# Patient Record
Sex: FEMALE | Race: WHITE
[De-identification: names, ages, dates, MRNs, and addresses within clinical notes are randomized per-mention and may not be internally consistent; named-entity substitution may affect disease eponyms.]

---

## 2020-02-27 ENCOUNTER — HOSPITAL ENCOUNTER (EMERGENCY)
Dept: HOSPITAL 25 - UCEAST | Age: 40
Discharge: HOME | End: 2020-02-27
Payer: COMMERCIAL

## 2020-02-27 VITALS — DIASTOLIC BLOOD PRESSURE: 65 MMHG | SYSTOLIC BLOOD PRESSURE: 104 MMHG

## 2020-02-27 DIAGNOSIS — F41.9: ICD-10-CM

## 2020-02-27 DIAGNOSIS — M77.9: Primary | ICD-10-CM

## 2020-02-27 DIAGNOSIS — Z79.899: ICD-10-CM

## 2020-02-27 PROCEDURE — 99211 OFF/OP EST MAY X REQ PHY/QHP: CPT

## 2020-02-27 PROCEDURE — G0463 HOSPITAL OUTPT CLINIC VISIT: HCPCS

## 2020-02-27 NOTE — UC
Shoulder Pain HPI





- HPI Summary


HPI Summary: 





40 yo female presents with RIGHT shoulder pain. She tells me that she is quite 

active with yoga and going to the gym. Gradually over the last 3 weeks she has 

noticed aching right shoulder pain with "clicking" during certain movement. She 

has been taking ibuprofen and resting, which helps. Participating in weight 

training and yoga make her pain worse. She is right handed. Denies specific 

injury, numbness, tingling. 





- History of Current Complaint


Stated Complaint: R SHOULDER PAIN


Time Seen by Provider: 02/27/20 11:38


Hx Obtained From: Patient


Onset/Duration: Gradual Onset


Timing: Constant


Severity Initially: Mild


Severity Currently: Mild


Pain Intensity: 2


Pain Scale Used: 0-10 Numeric





- Allergies/Home Medications


Allergies/Adverse Reactions: 


 Allergies











Allergy/AdvReac Type Severity Reaction Status Date / Time


 


No Known Allergies Allergy   Verified 02/27/20 11:41











Home Medications: 


 Home Medications





FLUoxetine CAP* [PROzac CAP*] 20 mg PO DAILY 09/24/19 [History Confirmed 02/27/ 20]


Ibuprofen [Ibu-200] 800 mg PO TID PRN 09/24/19 [History Confirmed 02/27/20]


LORazepam [Lorazepam] 2 mg PO BEDTIME 09/24/19 [History Confirmed 02/27/20]


Multivitamin [Once Daily] 1 each PO DAILY 09/24/19 [History Confirmed 02/27/20]











PMH/Surg Hx/FS Hx/Imm Hx





- Additional Past Medical History


Additional PMH: 





Migraines


Psychological History: Anxiety





- Surgical History


Surgical History: None





- Family History


Known Family History: Positive: Other - Migarines





- Social History


Occupation: Employed Full-time


Lives: With Family


Alcohol Use: Weekly


Alcohol Amount: 5


Substance Use Type: Marijuana


Smoking Status (MU): Never Smoked Tobacco





Review of Systems


All Other Systems Reviewed And Are Negative: No


Constitutional: Positive: Negative


Skin: Positive: Negative


Respiratory: Positive: Negative


Cardiovascular: Positive: Negative


Neurovascular: Positive: Negative


Musculoskeletal: Positive: Other: - Right shoulder pain


Neurological/Mental Status: Positive: Negative


Psychological: Positive: Negative





Physical Exam





- Summary


Physical Exam Summary: 





GENERAL: NAD. WDWN. No pain distress.


SKIN: No rashes, sores, lesions, or open wounds.


CHEST:  No accessory muscle use. Breathing comfortably and in no distress.


CV:  Pulses intact radial and ulnar. Cap refill <2seconds


MSK: FROM. Strength 5/5. No edema or obvious bony deformities. Positive: bob menjivar, coy, yoly, and fabiano. 


NEURO: Alert. Sensations intact C4-T1 b/l


PSYCH: Age appropriate behavior.


Triage Information Reviewed: Yes


Vital Signs: 





Vital Signs:











Temp Pulse Resp BP Pulse Ox


 


 97.5 F   70   20   104/65   100 


 


 02/27/20 11:36  02/27/20 11:36  02/27/20 11:36  02/27/20 11:36  02/27/20 11:36











Vital Signs Reviewed: Yes





Diagnostics





- Radiology


  ** Shoulder XR


Radiology Interpretation Completed By: Radiologist


Summary of Radiographic Findings: IMPRESSION: No fracture of the right shoulder 

is noted.





Shoulder Course/Dx





- Course


Course Of Treatment: 





XR as above.





I suspect her discomfort is from tendinitis to the shoulder, likely due from 

yoga and strength training. Recommended physical therapy, rest, ice, and 

continuing NSAIDs. Advised to refrain from strength training for ~1week to 

allow shoulder to rest and heal.


Recommend f/u with [facility] if symptoms do not improve. 





- Differential Dx/Diagnosis


Provider Diagnosis: 


 Shoulder tendinitis








Discharge ED





- Sign-Out/Discharge


Documenting (check all that apply): Patient Departure


All imaging exams completed and their final reports reviewed: Yes





- Discharge Plan


Condition: Stable


Disposition: HOME


Patient Education Materials:  Rotator Cuff Tendinitis (ED), Exercises for 

Shoulder Flexion and Extension (ED), Exercises for Shoulder Abduction and 

Adduction (ED)


Referrals: 


No Primary Care Phys,NOPCP [Primary Care Provider] - 


[facility] [Provider Group] - If Needed


Additional Instructions: 


If you develop a fever, shortness of breath, chest pain, new or worsening 

symptoms - please call your PCP or go to the ED immediately.


 


The x-ray of your shoulder was normal today.





I suspect your shoulder pain is due to overuse tendinitis and should improve 

with rest, ice, continuation of NSAIDs such as ibuprofen, and refraining from 

strength training or heavy lifting. 





I recommend refraining from yoga/gym/strength training for at least 1 week to 

allow your shoulder to rest and heal - then may gradually return to activities 

as tolerated.





If your symptoms do not improve - I recommend that you call [facility] at 

the number below to schedule an appointment for a recheck





- Billing Disposition and Condition


Condition: STABLE


Disposition: Home